# Patient Record
Sex: FEMALE | Race: WHITE | NOT HISPANIC OR LATINO | Employment: FULL TIME | URBAN - METROPOLITAN AREA
[De-identification: names, ages, dates, MRNs, and addresses within clinical notes are randomized per-mention and may not be internally consistent; named-entity substitution may affect disease eponyms.]

---

## 2023-10-04 ENCOUNTER — HOSPITAL ENCOUNTER (OUTPATIENT)
Dept: RADIOLOGY | Facility: HOSPITAL | Age: 58
Discharge: HOME | End: 2023-10-04
Payer: COMMERCIAL

## 2023-10-04 ENCOUNTER — OFFICE VISIT (OUTPATIENT)
Dept: ORTHOPEDIC SURGERY | Facility: HOSPITAL | Age: 58
End: 2023-10-04
Payer: COMMERCIAL

## 2023-10-04 VITALS — WEIGHT: 125 LBS | HEIGHT: 65 IN | BODY MASS INDEX: 20.83 KG/M2

## 2023-10-04 DIAGNOSIS — M79.645 PAIN OF LEFT THUMB: ICD-10-CM

## 2023-10-04 DIAGNOSIS — S63.8X2A CMC (CARPOMETACARPAL JOINT) SPRAINS, LEFT, INITIAL ENCOUNTER: ICD-10-CM

## 2023-10-04 PROCEDURE — 73130 X-RAY EXAM OF HAND: CPT | Mod: LT

## 2023-10-04 PROCEDURE — L3924 HFO WITHOUT JOINTS PRE OTS: HCPCS | Performed by: PHYSICIAN ASSISTANT

## 2023-10-04 PROCEDURE — 99213 OFFICE O/P EST LOW 20 MIN: CPT | Performed by: PHYSICIAN ASSISTANT

## 2023-10-04 PROCEDURE — 73130 X-RAY EXAM OF HAND: CPT | Mod: LEFT SIDE | Performed by: RADIOLOGY

## 2023-10-04 PROCEDURE — 99203 OFFICE O/P NEW LOW 30 MIN: CPT | Performed by: PHYSICIAN ASSISTANT

## 2023-10-04 RX ORDER — DEXTROAMPHETAMINE SACCHARATE, AMPHETAMINE ASPARTATE, DEXTROAMPHETAMINE SULFATE AND AMPHETAMINE SULFATE 7.5; 7.5; 7.5; 7.5 MG/1; MG/1; MG/1; MG/1
30 TABLET ORAL
COMMUNITY

## 2023-10-04 ASSESSMENT — PAIN SCALES - GENERAL: PAINLEVEL_OUTOF10: 4

## 2023-10-04 ASSESSMENT — PAIN - FUNCTIONAL ASSESSMENT: PAIN_FUNCTIONAL_ASSESSMENT: 0-10

## 2023-10-04 ASSESSMENT — PAIN DESCRIPTION - DESCRIPTORS: DESCRIPTORS: ACHING;SHARP

## 2023-10-04 NOTE — LETTER
October 4, 2023     Emily Wen Pla, DO  [address]  [facility]  [facility] [address]    Patient: Tiarra Mcclain   YOB: 1965   Date of Visit: 10/4/2023       Dear Dr. Emily Wen Pla, DO:    Thank you for referring Tiarra Mcclain to me for evaluation. Below are my notes for this consultation.  If you have questions, please do not hesitate to call me. I look forward to following your patient along with you.       Sincerely,     Abhinav Ruiz PA-C      CC: No Recipients  ______________________________________________________________________________________    Subjective   Patient ID: Tiarra Mcclain is a 57 y.o. female.    Chief Complaint: left hand pain           HPI:  Tiarra Mcclain is a 57 y.o. female left hand dominant female presents to the orthopedic walk-in clinic for complaint of left hand pain.  She states that for the last 3 weeks or so she has been experiencing pain at the base of her left thumb.  No injury or trauma that she could recall.  Pain is worse with activity including simple grasping and twisting type motions.  She has difficulty opening a jar.  She has not utilize any medications for pain or discomfort.    ROS:  Constitutional: No fever, no chills, not feeling tired, no recent weight gain and no recent weight loss  ENT: No nosebleeds  Cardiovascular: No chest pain  Respiratory: No shortness of breath and no cough  Gastrointestinal: No abdominal pain, no nausea, no diarrhea, and no vomiting  Musculoskeletal: No arthralgias  Integumentary: No rashes and no skin lesions  Neurological: No headache  Psychiatric: No sleep disturbances no depression  Endocrine: No muscle weakness and no muscle cramps  Hematologic/lymphatic: No swelling glands and no tendency for easy bruising    Objective  57-year-old female well appearing in no acute distress. Alert and oriented ×3.  Skin intact bilateral upper extremities.   Normal tandem gait.  Coordination and balance intact.  Bilateral upper extremity compartments supple.  5 out of 5 distal motor strength bilaterally.  C2 through C7 sensation intact bilaterally.  2+ distal pulses bilaterally.  She has tenderness palpation over the first CMC joint of the left hand.  She has pain when making a full fist.  Mild swelling.  No ecchymosis.    Image Results:  X-rays of the left hand taken today in the office were reviewed.  Some early degenerative changes of the first CMC joint are noted.      Assessment/Plan  Encounter Diagnoses:  Left CMC joint osteoarthritis    Orders Placed This Encounter   • CMC Comfort Cool   • XR hand left 3+ views       I recommend that she apply a small amount of topical diclofenac to the area of pain 3-4 times a day.  She was provided with a Comfort Cool thumb wrap.  We discussed that this is mainly symptomatic control but there are some surgical options if she fails conservative care.  If she does not see an improvement in 2 to 3 weeks she will follow-up with one of our hand and wrist specialist.  She verbalized understanding and agreed with the plan.    Patient was prescribed a Comfort Cool thumb wrap for CMC joint osteoarthritis. The patient has weakness, instability and/or deformity of their left hand which requires stabilization from this orthosis to improve their function.      Verbal and written instructions for the use, wear schedule, cleaning and application of this item were given.  Patient was instructed that should the brace result in increased pain, decreased sensation, increased swelling, or an overall worsening of their medical condition, to please contact our office immediately.     Orthotic management and training was provided for skin care, modifications due to healing tissues, edema changes, interruption in skin integrity, and safety precautions with the orthosis.    This office note was dictated using Dragon voice to text software and was not proofread for spelling  or grammatical errors

## 2023-10-04 NOTE — PROGRESS NOTES
Subjective    Patient ID: Tiarra Mcclain is a 57 y.o. female.    Chief Complaint: left hand pain           HPI:  Tiarra Mcclain is a 57 y.o. female left hand dominant female presents to the orthopedic walk-in clinic for complaint of left hand pain.  She states that for the last 3 weeks or so she has been experiencing pain at the base of her left thumb.  No injury or trauma that she could recall.  Pain is worse with activity including simple grasping and twisting type motions.  She has difficulty opening a jar.  She has not utilize any medications for pain or discomfort.    ROS:  Constitutional: No fever, no chills, not feeling tired, no recent weight gain and no recent weight loss  ENT: No nosebleeds  Cardiovascular: No chest pain  Respiratory: No shortness of breath and no cough  Gastrointestinal: No abdominal pain, no nausea, no diarrhea, and no vomiting  Musculoskeletal: No arthralgias  Integumentary: No rashes and no skin lesions  Neurological: No headache  Psychiatric: No sleep disturbances no depression  Endocrine: No muscle weakness and no muscle cramps  Hematologic/lymphatic: No swelling glands and no tendency for easy bruising    Objective   57-year-old female well appearing in no acute distress. Alert and oriented ×3.  Skin intact bilateral upper extremities.   Normal tandem gait. Coordination and balance intact.  Bilateral upper extremity compartments supple.  5 out of 5 distal motor strength bilaterally.  C2 through C7 sensation intact bilaterally.  2+ distal pulses bilaterally.  She has tenderness palpation over the first CMC joint of the left hand.  She has pain when making a full fist.  Mild swelling.  No ecchymosis.    Image Results:  X-rays of the left hand taken today in the office were reviewed.  Some early degenerative changes of the first CMC joint are noted.      Assessment/Plan   Encounter Diagnoses:  Left CMC joint osteoarthritis    Orders Placed This Encounter    [facility] Cool     XR hand left 3+ views       I recommend that she apply a small amount of topical diclofenac to the area of pain 3-4 times a day.  She was provided with a Comfort Cool thumb wrap.  We discussed that this is mainly symptomatic control but there are some surgical options if she fails conservative care.  If she does not see an improvement in 2 to 3 weeks she will follow-up with one of our hand and wrist specialist.  She verbalized understanding and agreed with the plan.    Patient was prescribed a Comfort Cool thumb wrap for CMC joint osteoarthritis. The patient has weakness, instability and/or deformity of their left hand which requires stabilization from this orthosis to improve their function.      Verbal and written instructions for the use, wear schedule, cleaning and application of this item were given.  Patient was instructed that should the brace result in increased pain, decreased sensation, increased swelling, or an overall worsening of their medical condition, to please contact our office immediately.     Orthotic management and training was provided for skin care, modifications due to healing tissues, edema changes, interruption in skin integrity, and safety precautions with the orthosis.    This office note was dictated using Dragon voice to text software and was not proofread for spelling or grammatical errors

## 2024-10-29 ENCOUNTER — TELEMEDICINE (OUTPATIENT)
Dept: PRIMARY CARE | Facility: CLINIC | Age: 59
End: 2024-10-29
Payer: COMMERCIAL

## 2024-10-29 ENCOUNTER — TELEPHONE (OUTPATIENT)
Dept: PRIMARY CARE | Facility: CLINIC | Age: 59
End: 2024-10-29

## 2024-10-29 DIAGNOSIS — R30.0 BURNING WITH URINATION: ICD-10-CM

## 2024-10-29 LAB
POC APPEARANCE, URINE: CLEAR
POC BILIRUBIN, URINE: NEGATIVE
POC BLOOD, URINE: ABNORMAL
POC COLOR, URINE: ABNORMAL
POC GLUCOSE, URINE: NEGATIVE MG/DL
POC KETONES, URINE: NEGATIVE MG/DL
POC LEUKOCYTES, URINE: ABNORMAL
POC NITRITE,URINE: NEGATIVE
POC PH, URINE: 7 PH
POC PROTEIN, URINE: NEGATIVE MG/DL
POC SPECIFIC GRAVITY, URINE: 1.01
POC UROBILINOGEN, URINE: 0.2 EU/DL

## 2024-10-29 PROCEDURE — 1036F TOBACCO NON-USER: CPT | Performed by: FAMILY MEDICINE

## 2024-10-29 PROCEDURE — 99213 OFFICE O/P EST LOW 20 MIN: CPT | Performed by: FAMILY MEDICINE

## 2024-10-29 PROCEDURE — 81003 URINALYSIS AUTO W/O SCOPE: CPT | Performed by: FAMILY MEDICINE

## 2024-10-29 RX ORDER — SULFAMETHOXAZOLE AND TRIMETHOPRIM 800; 160 MG/1; MG/1
1 TABLET ORAL 2 TIMES DAILY
Qty: 14 TABLET | Refills: 0 | Status: SHIPPED | OUTPATIENT
Start: 2024-10-29 | End: 2024-11-05

## 2024-10-30 LAB
APPEARANCE UR: ABNORMAL
BACTERIA #/AREA URNS AUTO: ABNORMAL /HPF
BILIRUB UR STRIP.AUTO-MCNC: NEGATIVE MG/DL
COLOR UR: COLORLESS
GLUCOSE UR STRIP.AUTO-MCNC: NORMAL MG/DL
HOLD SPECIMEN: NORMAL
KETONES UR STRIP.AUTO-MCNC: NEGATIVE MG/DL
LEUKOCYTE ESTERASE UR QL STRIP.AUTO: ABNORMAL
MUCOUS THREADS #/AREA URNS AUTO: ABNORMAL /LPF
NITRITE UR QL STRIP.AUTO: NEGATIVE
PH UR STRIP.AUTO: 7 [PH]
PROT UR STRIP.AUTO-MCNC: NEGATIVE MG/DL
RBC # UR STRIP.AUTO: ABNORMAL /UL
RBC #/AREA URNS AUTO: ABNORMAL /HPF
SP GR UR STRIP.AUTO: 1
UROBILINOGEN UR STRIP.AUTO-MCNC: NORMAL MG/DL
WBC #/AREA URNS AUTO: ABNORMAL /HPF

## 2024-10-30 PROCEDURE — 87086 URINE CULTURE/COLONY COUNT: CPT

## 2024-10-30 PROCEDURE — 81001 URINALYSIS AUTO W/SCOPE: CPT

## 2024-10-30 PROCEDURE — 87186 SC STD MICRODIL/AGAR DIL: CPT

## 2024-11-01 LAB — BACTERIA UR CULT: ABNORMAL
